# Patient Record
Sex: FEMALE | Race: WHITE | URBAN - METROPOLITAN AREA
[De-identification: names, ages, dates, MRNs, and addresses within clinical notes are randomized per-mention and may not be internally consistent; named-entity substitution may affect disease eponyms.]

---

## 2019-11-27 ENCOUNTER — IMPORTED ENCOUNTER (OUTPATIENT)
Dept: URBAN - METROPOLITAN AREA CLINIC 50 | Facility: CLINIC | Age: 64
End: 2019-11-27

## 2019-11-27 ENCOUNTER — PREPPED CHART (OUTPATIENT)
Dept: URBAN - METROPOLITAN AREA CLINIC 52 | Facility: CLINIC | Age: 64
End: 2019-11-27

## 2019-11-27 NOTE — PATIENT DISCUSSION
NOT VISUALLY SIGNIFICANT: Informed patient that their cataract is not visually significant or does not meet the criteria for cataract surgery. Recommend attention to cataract symptoms monitoring by regular examinations. Patient instructed to call with changes in vision.

## 2019-12-02 ENCOUNTER — IMPORTED ENCOUNTER (OUTPATIENT)
Dept: URBAN - METROPOLITAN AREA CLINIC 50 | Facility: CLINIC | Age: 64
End: 2019-12-02

## 2021-04-16 ASSESSMENT — VISUAL ACUITY
OS_CC: 20/25
OD_SC: 20/60
OS_SC: 20/100
OU_CC: J1+
OD_PH: 20/40
OS_PH: 20/40
OD_CC: 20/30

## 2021-04-16 ASSESSMENT — KERATOMETRY
OS_K1POWER_DIOPTERS: 46.00
OD_AXISANGLE_DEGREES: 88
OD_K1POWER_DIOPTERS: 45.75
OD_K2POWER_DIOPTERS: 44.00
OS_AXISANGLE_DEGREES: 079
OS_K2POWER_DIOPTERS: 44.00
OS_AXISANGLE2_DEGREES: 169
OD_AXISANGLE2_DEGREES: 178

## 2021-04-16 ASSESSMENT — TONOMETRY
OS_IOP_MMHG: 12
OD_IOP_MMHG: 12

## 2021-04-17 ASSESSMENT — TONOMETRY
OD_IOP_MMHG: 12
OS_IOP_MMHG: 12

## 2021-04-17 ASSESSMENT — VISUAL ACUITY
OS_CC: J1+@ 16 IN
OD_CC: J1+@ 16 IN
OD_CC: 20/30
OS_CC: 20/25

## 2021-04-21 ENCOUNTER — ROUTINE EXAM (OUTPATIENT)
Dept: URBAN - METROPOLITAN AREA CLINIC 52 | Facility: CLINIC | Age: 66
End: 2021-04-21

## 2021-04-21 DIAGNOSIS — H52.4: ICD-10-CM

## 2021-04-21 DIAGNOSIS — Z01.00: ICD-10-CM

## 2021-04-21 DIAGNOSIS — H25.11: ICD-10-CM

## 2021-04-21 DIAGNOSIS — H25.812: ICD-10-CM

## 2021-04-21 DIAGNOSIS — H43.813: ICD-10-CM

## 2021-04-21 PROCEDURE — 92014 COMPRE OPH EXAM EST PT 1/>: CPT

## 2021-04-21 PROCEDURE — 92015 DETERMINE REFRACTIVE STATE: CPT

## 2021-04-21 ASSESSMENT — VISUAL ACUITY
OS_CC: 20/20-1
OS_GLARE: 20/30
OD_GLARE: 20/30
OS_GLARE: 20/40
OD_GLARE: 20/40
OU_CC: J1+
OD_CC: 20/20-1

## 2021-04-21 ASSESSMENT — TONOMETRY
OS_IOP_MMHG: 17
OD_IOP_MMHG: 17

## 2021-04-21 NOTE — PATIENT DISCUSSION
Good ocular health on dilated exam today. Eyeglass prescription given. Patient instructed to call office immediately if sudden changes in vision occur. Emphasized importance of sunglasses and healthy lifestyle.

## 2022-04-27 ENCOUNTER — ESTABLISHED PATIENT (OUTPATIENT)
Dept: URBAN - METROPOLITAN AREA CLINIC 52 | Facility: CLINIC | Age: 67
End: 2022-04-27

## 2022-04-27 DIAGNOSIS — Z01.01: ICD-10-CM

## 2022-04-27 DIAGNOSIS — H52.4: ICD-10-CM

## 2022-04-27 PROCEDURE — 92015 DETERMINE REFRACTIVE STATE: CPT

## 2022-04-27 PROCEDURE — 92014 COMPRE OPH EXAM EST PT 1/>: CPT

## 2022-04-27 ASSESSMENT — TONOMETRY
OS_IOP_MMHG: 21
OD_IOP_MMHG: 21

## 2022-04-27 ASSESSMENT — VISUAL ACUITY
OU_CC: J1+
OD_GLARE: 20/20
OS_CC: 20/20-1
OD_CC: 20/20
OS_GLARE: 20/20

## 2022-04-27 NOTE — PATIENT DISCUSSION
IOP elevated 21, OU. h/o of 12 and 17 IOP. Patient was using Fluticasone nasal spray. Has been a few days to a week since she has last used it. Patient did state she was using medical marijuana and hasn't used it in a few months as she does not like it. Suspicion that it could have been lowering her IOP. Patient is leaving to go out of town for the summer. Will see patient back in 6 months for IOP check.

## 2022-12-20 ENCOUNTER — FOLLOW UP (OUTPATIENT)
Dept: URBAN - METROPOLITAN AREA CLINIC 52 | Facility: CLINIC | Age: 67
End: 2022-12-20

## 2022-12-20 PROCEDURE — 92012 INTRM OPH EXAM EST PATIENT: CPT

## 2022-12-20 ASSESSMENT — VISUAL ACUITY
OU_CC: 20/20
OD_CC: 20/20-1
OS_CC: 20/20

## 2022-12-20 ASSESSMENT — TONOMETRY
OS_IOP_MMHG: 17
OD_IOP_MMHG: 17

## 2023-12-01 ENCOUNTER — COMPREHENSIVE EXAM (OUTPATIENT)
Dept: URBAN - METROPOLITAN AREA CLINIC 24 | Facility: CLINIC | Age: 68
End: 2023-12-01

## 2023-12-01 DIAGNOSIS — H52.4: ICD-10-CM

## 2023-12-01 DIAGNOSIS — Z01.01: ICD-10-CM

## 2023-12-01 PROCEDURE — 92015 DETERMINE REFRACTIVE STATE: CPT

## 2023-12-01 PROCEDURE — 92014 COMPRE OPH EXAM EST PT 1/>: CPT

## 2023-12-01 ASSESSMENT — VISUAL ACUITY
OS_PH: 20/25
OD_CC: 20/25
OU_CC: 20/20-1
OU_CC: 20/20"16IN
OS_CC: 20/40

## 2023-12-01 ASSESSMENT — TONOMETRY
OD_IOP_MMHG: 16
OS_IOP_MMHG: 21
OD_IOP_MMHG: 22
OS_IOP_MMHG: 15

## 2023-12-01 ASSESSMENT — KERATOMETRY
OD_K2POWER_DIOPTERS: 45.50
OS_K2POWER_DIOPTERS: 45.50
OD_K1POWER_DIOPTERS: 43.75
OD_AXISANGLE2_DEGREES: 91
OS_AXISANGLE2_DEGREES: 82
OS_AXISANGLE_DEGREES: 172
OD_AXISANGLE_DEGREES: 1
OS_K1POWER_DIOPTERS: 43.75

## 2024-03-25 ENCOUNTER — EMERGENCY VISIT (OUTPATIENT)
Dept: URBAN - METROPOLITAN AREA CLINIC 50 | Facility: LOCATION | Age: 69
End: 2024-03-25

## 2024-03-25 DIAGNOSIS — H43.813: ICD-10-CM

## 2024-03-25 PROCEDURE — 99213 OFFICE O/P EST LOW 20 MIN: CPT

## 2024-03-25 ASSESSMENT — KERATOMETRY
OS_K1POWER_DIOPTERS: 43.75
OS_AXISANGLE_DEGREES: 172
OS_K2POWER_DIOPTERS: 45.50
OD_K2POWER_DIOPTERS: 45.50
OD_K1POWER_DIOPTERS: 43.75
OS_AXISANGLE2_DEGREES: 82
OD_AXISANGLE2_DEGREES: 91
OD_AXISANGLE_DEGREES: 1

## 2024-03-25 ASSESSMENT — TONOMETRY
OS_IOP_MMHG: 23
OD_IOP_MMHG: 23
OD_IOP_MMHG: 17
OS_IOP_MMHG: 17

## 2024-03-25 ASSESSMENT — VISUAL ACUITY
OS_CC: 20/20
OD_CC: 20/20
OU_CC: 20/20

## 2024-04-22 ENCOUNTER — FOLLOW UP (OUTPATIENT)
Dept: URBAN - METROPOLITAN AREA CLINIC 50 | Facility: LOCATION | Age: 69
End: 2024-04-22

## 2024-04-22 DIAGNOSIS — H43.812: ICD-10-CM

## 2024-04-22 PROCEDURE — 99214 OFFICE O/P EST MOD 30 MIN: CPT

## 2024-04-22 ASSESSMENT — TONOMETRY
OD_IOP_MMHG: 15
OD_IOP_MMHG: 09
OS_IOP_MMHG: 10
OS_IOP_MMHG: 16

## 2024-04-22 ASSESSMENT — VISUAL ACUITY
OD_CC: 20/25
OS_CC: 20/25
OU_CC: 20/25

## 2024-12-05 ENCOUNTER — COMPREHENSIVE EXAM (OUTPATIENT)
Age: 69
End: 2024-12-05

## 2024-12-05 DIAGNOSIS — H52.4: ICD-10-CM

## 2024-12-05 DIAGNOSIS — Z01.01: ICD-10-CM

## 2024-12-05 PROCEDURE — 92015 DETERMINE REFRACTIVE STATE: CPT

## 2024-12-05 PROCEDURE — 92014 COMPRE OPH EXAM EST PT 1/>: CPT
